# Patient Record
Sex: MALE | Race: WHITE | NOT HISPANIC OR LATINO | URBAN - METROPOLITAN AREA
[De-identification: names, ages, dates, MRNs, and addresses within clinical notes are randomized per-mention and may not be internally consistent; named-entity substitution may affect disease eponyms.]

---

## 2017-01-24 ENCOUNTER — FOLLOW UP ESTABLISHED (OUTPATIENT)
Dept: URBAN - METROPOLITAN AREA CLINIC 24 | Facility: CLINIC | Age: 77
End: 2017-01-24
Payer: MEDICARE

## 2017-01-24 DIAGNOSIS — H47.022 HEMORRHAGE IN OPTIC NERVE SHEATH, LEFT EYE: Primary | ICD-10-CM

## 2017-01-24 PROCEDURE — 99214 OFFICE O/P EST MOD 30 MIN: CPT | Performed by: OPHTHALMOLOGY

## 2017-01-24 PROCEDURE — 92250 FUNDUS PHOTOGRAPHY W/I&R: CPT | Performed by: OPHTHALMOLOGY

## 2017-01-24 PROCEDURE — 92083 EXTENDED VISUAL FIELD XM: CPT | Performed by: OPHTHALMOLOGY

## 2017-01-24 RX ORDER — PREDNISONE 20 MG/1
20 MG TABLET ORAL
Qty: 7 | Refills: 0 | Status: INACTIVE
Start: 2017-01-24 | End: 2017-01-30

## 2017-01-24 RX ORDER — DUREZOL 0.5 MG/ML
0.05 % EMULSION OPHTHALMIC
Qty: 1 | Refills: 0 | Status: INACTIVE
Start: 2017-01-24 | End: 2017-02-20

## 2017-01-24 RX ORDER — KETOROLAC TROMETHAMINE 5 MG/ML
0.5 % SOLUTION OPHTHALMIC
Qty: 1 | Refills: 0 | Status: INACTIVE
Start: 2017-01-24 | End: 2017-02-28

## 2017-01-24 RX ORDER — CHOLECALCIFEROL (VITAMIN D3) 10 MCG
CAPSULE ORAL
Qty: 0 | Refills: 0 | Status: INACTIVE
Start: 2017-01-24 | End: 2017-01-24

## 2017-01-24 RX ORDER — MOXIFLOXACIN HYDROCHLORIDE 5 MG/ML
0.5 % SOLUTION/ DROPS OPHTHALMIC
Qty: 1 | Refills: 1 | Status: INACTIVE
Start: 2017-01-24 | End: 2017-02-28

## 2017-01-24 ASSESSMENT — INTRAOCULAR PRESSURE
OS: 18
OD: 16

## 2017-02-08 ENCOUNTER — Encounter (OUTPATIENT)
Dept: URBAN - METROPOLITAN AREA CLINIC 10 | Facility: CLINIC | Age: 77
End: 2017-02-08
Payer: MEDICARE

## 2017-02-08 DIAGNOSIS — Z01.818 ENCOUNTER FOR OTHER PREPROCEDURAL EXAMINATION: Primary | ICD-10-CM

## 2017-02-08 PROCEDURE — 99213 OFFICE O/P EST LOW 20 MIN: CPT | Performed by: PHYSICIAN ASSISTANT

## 2017-02-08 RX ORDER — ATENOLOL 50 MG/1
50 MG TABLET ORAL
Qty: 0 | Refills: 0 | Status: ACTIVE
Start: 2017-02-08

## 2017-02-14 ENCOUNTER — SURGERY (OUTPATIENT)
Dept: URBAN - METROPOLITAN AREA SURGERY 12 | Facility: SURGERY | Age: 77
End: 2017-02-14
Payer: MEDICARE

## 2017-02-14 PROCEDURE — 66172 INCISION OF EYE: CPT | Performed by: OPHTHALMOLOGY

## 2017-02-15 ENCOUNTER — POST OP (OUTPATIENT)
Dept: URBAN - METROPOLITAN AREA CLINIC 24 | Facility: CLINIC | Age: 77
End: 2017-02-15

## 2017-02-15 PROCEDURE — 99024 POSTOP FOLLOW-UP VISIT: CPT | Performed by: OPHTHALMOLOGY

## 2017-02-15 RX ORDER — TIMOLOL MALEATE 5 MG/ML
0.5 % SOLUTION/ DROPS OPHTHALMIC
Qty: 3 | Refills: 3 | Status: INACTIVE
Start: 2017-02-15 | End: 2017-02-15

## 2017-02-15 RX ORDER — ATROPINE SULFATE 10 MG/ML
1 % SOLUTION/ DROPS OPHTHALMIC
Qty: 1 | Refills: 0 | Status: INACTIVE
Start: 2017-02-15 | End: 2017-04-21

## 2017-02-15 RX ORDER — TIMOLOL MALEATE 5 MG/ML
0.5 % SOLUTION/ DROPS OPHTHALMIC
Qty: 3 | Refills: 3 | Status: INACTIVE
Start: 2017-02-15 | End: 2017-04-21

## 2017-02-15 RX ORDER — LATANOPROST 50 UG/ML
0.005 % SOLUTION OPHTHALMIC
Qty: 3 | Refills: 3 | Status: INACTIVE
Start: 2017-02-15 | End: 2017-04-21

## 2017-02-15 ASSESSMENT — INTRAOCULAR PRESSURE: OD: 2

## 2017-02-20 ENCOUNTER — Encounter (OUTPATIENT)
Dept: URBAN - METROPOLITAN AREA CLINIC 10 | Facility: CLINIC | Age: 77
End: 2017-02-20
Payer: MEDICARE

## 2017-02-20 PROCEDURE — 65855 TRABECULOPLASTY LASER SURG: CPT | Performed by: OPHTHALMOLOGY

## 2017-02-20 PROCEDURE — 68200 TREAT EYELID BY INJECTION: CPT | Performed by: OPHTHALMOLOGY

## 2017-02-20 PROCEDURE — 99024 POSTOP FOLLOW-UP VISIT: CPT | Performed by: OPHTHALMOLOGY

## 2017-02-20 PROCEDURE — CRBAL CREDIT BALANCE: CUSTOM | Performed by: OPHTHALMOLOGY

## 2017-02-20 RX ORDER — DUREZOL 0.5 MG/ML
0.05 % EMULSION OPHTHALMIC
Qty: 1 | Refills: 0 | Status: INACTIVE
Start: 2017-02-20 | End: 2017-03-17

## 2017-02-20 ASSESSMENT — INTRAOCULAR PRESSURE
OD: 7
OS: 16
OS: 17
OD: 7
OS: 17
OS: 16

## 2017-02-28 ENCOUNTER — POST OP (OUTPATIENT)
Dept: URBAN - METROPOLITAN AREA CLINIC 24 | Facility: CLINIC | Age: 77
End: 2017-02-28

## 2017-02-28 PROCEDURE — 99024 POSTOP FOLLOW-UP VISIT: CPT | Performed by: OPHTHALMOLOGY

## 2017-02-28 RX ORDER — HYPROMELLOSE 3 MG/G
0.3 % GEL OPHTHALMIC
Qty: 0 | Refills: 0 | Status: INACTIVE
Start: 2017-02-20 | End: 2018-10-10

## 2017-02-28 RX ORDER — POLYETHYLENE GLYCOL AND PROPYLENE GLYCOL 4; 3 MG/ML; MG/ML
SOLUTION/ DROPS OPHTHALMIC
Qty: 0 | Refills: 0 | Status: ACTIVE
Start: 2017-02-20

## 2017-02-28 ASSESSMENT — INTRAOCULAR PRESSURE: OD: 5

## 2017-03-02 ENCOUNTER — POST OP (OUTPATIENT)
Dept: URBAN - METROPOLITAN AREA CLINIC 24 | Facility: CLINIC | Age: 77
End: 2017-03-02

## 2017-03-02 PROCEDURE — 99024 POSTOP FOLLOW-UP VISIT: CPT | Performed by: OPTOMETRIST

## 2017-03-02 ASSESSMENT — INTRAOCULAR PRESSURE
OD: 5
OS: 12

## 2017-03-14 ENCOUNTER — POST OP (OUTPATIENT)
Dept: URBAN - METROPOLITAN AREA CLINIC 24 | Facility: CLINIC | Age: 77
End: 2017-03-14

## 2017-03-14 PROCEDURE — 99024 POSTOP FOLLOW-UP VISIT: CPT | Performed by: OPHTHALMOLOGY

## 2017-03-14 ASSESSMENT — INTRAOCULAR PRESSURE: OD: 8

## 2017-03-21 ENCOUNTER — POST OP (OUTPATIENT)
Dept: URBAN - METROPOLITAN AREA CLINIC 24 | Facility: CLINIC | Age: 77
End: 2017-03-21

## 2017-03-21 PROCEDURE — 99024 POSTOP FOLLOW-UP VISIT: CPT | Performed by: OPTOMETRIST

## 2017-03-21 ASSESSMENT — INTRAOCULAR PRESSURE
OD: 12
OS: 14

## 2017-03-28 ENCOUNTER — POST OP (OUTPATIENT)
Dept: URBAN - METROPOLITAN AREA CLINIC 24 | Facility: CLINIC | Age: 77
End: 2017-03-28
Payer: MEDICARE

## 2017-03-28 PROCEDURE — 99024 POSTOP FOLLOW-UP VISIT: CPT | Performed by: OPHTHALMOLOGY

## 2017-03-28 ASSESSMENT — INTRAOCULAR PRESSURE
OD: 7
OS: 16

## 2017-04-07 ENCOUNTER — POST OP (OUTPATIENT)
Dept: URBAN - METROPOLITAN AREA CLINIC 24 | Facility: CLINIC | Age: 77
End: 2017-04-07

## 2017-04-07 PROCEDURE — 99024 POSTOP FOLLOW-UP VISIT: CPT | Performed by: OPTOMETRIST

## 2017-04-07 ASSESSMENT — INTRAOCULAR PRESSURE
OD: 8
OS: 16

## 2017-04-21 ENCOUNTER — POST OP (OUTPATIENT)
Dept: URBAN - METROPOLITAN AREA CLINIC 24 | Facility: CLINIC | Age: 77
End: 2017-04-21

## 2017-04-21 DIAGNOSIS — Z98.83 FILTERING (VITREOUS) BLEB AFTER GLAUCOMA SURGERY STATUS: Primary | ICD-10-CM

## 2017-04-21 PROCEDURE — 99024 POSTOP FOLLOW-UP VISIT: CPT | Performed by: OPHTHALMOLOGY

## 2017-04-21 RX ORDER — TIMOLOL MALEATE 5 MG/ML
0.5 % SOLUTION/ DROPS OPHTHALMIC
Qty: 3 | Refills: 3 | Status: INACTIVE
Start: 2017-04-21 | End: 2018-05-07

## 2017-04-21 RX ORDER — LATANOPROST 50 UG/ML
0.005 % SOLUTION OPHTHALMIC
Qty: 3 | Refills: 3 | Status: INACTIVE
Start: 2017-04-21 | End: 2018-05-07

## 2017-04-21 RX ORDER — DUREZOL 0.5 MG/ML
0.05 % EMULSION OPHTHALMIC
Qty: 1 | Refills: 2 | Status: INACTIVE
Start: 2017-04-21 | End: 2018-10-10

## 2017-04-21 ASSESSMENT — INTRAOCULAR PRESSURE
OS: 18
OD: 8

## 2017-05-01 ENCOUNTER — POST OP (OUTPATIENT)
Dept: URBAN - METROPOLITAN AREA CLINIC 24 | Facility: CLINIC | Age: 77
End: 2017-05-01

## 2017-05-01 PROCEDURE — 99024 POSTOP FOLLOW-UP VISIT: CPT | Performed by: OPTOMETRIST

## 2017-05-01 ASSESSMENT — INTRAOCULAR PRESSURE
OD: 10
OS: 18

## 2017-05-09 ENCOUNTER — FOLLOW UP ESTABLISHED (OUTPATIENT)
Dept: URBAN - METROPOLITAN AREA CLINIC 24 | Facility: CLINIC | Age: 77
End: 2017-05-09

## 2017-05-09 PROCEDURE — 99024 POSTOP FOLLOW-UP VISIT: CPT | Performed by: OPHTHALMOLOGY

## 2017-05-09 ASSESSMENT — INTRAOCULAR PRESSURE: OD: 9

## 2017-05-23 ENCOUNTER — POST OP (OUTPATIENT)
Dept: URBAN - METROPOLITAN AREA CLINIC 24 | Facility: CLINIC | Age: 77
End: 2017-05-23

## 2017-05-23 PROCEDURE — 99024 POSTOP FOLLOW-UP VISIT: CPT | Performed by: OPHTHALMOLOGY

## 2017-05-23 ASSESSMENT — INTRAOCULAR PRESSURE
OD: 7
OS: 16

## 2017-09-26 ENCOUNTER — FOLLOW UP ESTABLISHED (OUTPATIENT)
Dept: URBAN - METROPOLITAN AREA CLINIC 24 | Facility: CLINIC | Age: 77
End: 2017-09-26
Payer: MEDICARE

## 2017-09-26 DIAGNOSIS — H40.1133 PRIMARY OPEN-ANGLE GLAUCOMA, BILATERAL, SEVERE STAGE: Primary | ICD-10-CM

## 2017-09-26 PROCEDURE — 92014 COMPRE OPH EXAM EST PT 1/>: CPT | Performed by: OPHTHALMOLOGY

## 2017-09-26 PROCEDURE — 92083 EXTENDED VISUAL FIELD XM: CPT | Performed by: OPHTHALMOLOGY

## 2017-09-26 ASSESSMENT — INTRAOCULAR PRESSURE
OS: 16
OD: 9

## 2017-10-18 ENCOUNTER — FOLLOW UP ESTABLISHED (OUTPATIENT)
Dept: URBAN - METROPOLITAN AREA CLINIC 24 | Facility: CLINIC | Age: 77
End: 2017-10-18
Payer: MEDICARE

## 2017-10-18 PROCEDURE — 92012 INTRM OPH EXAM EST PATIENT: CPT | Performed by: OPHTHALMOLOGY

## 2017-10-18 ASSESSMENT — INTRAOCULAR PRESSURE
OS: 14
OD: 11

## 2018-04-11 ENCOUNTER — FOLLOW UP ESTABLISHED (OUTPATIENT)
Dept: URBAN - METROPOLITAN AREA CLINIC 24 | Facility: CLINIC | Age: 78
End: 2018-04-11
Payer: MEDICARE

## 2018-04-11 PROCEDURE — 92012 INTRM OPH EXAM EST PATIENT: CPT | Performed by: OPHTHALMOLOGY

## 2018-04-11 ASSESSMENT — INTRAOCULAR PRESSURE
OD: 10
OS: 15

## 2018-10-10 ENCOUNTER — FOLLOW UP ESTABLISHED (OUTPATIENT)
Dept: URBAN - METROPOLITAN AREA CLINIC 24 | Facility: CLINIC | Age: 78
End: 2018-10-10
Payer: MEDICARE

## 2018-10-10 PROCEDURE — 92012 INTRM OPH EXAM EST PATIENT: CPT | Performed by: OPHTHALMOLOGY

## 2018-10-10 PROCEDURE — 92083 EXTENDED VISUAL FIELD XM: CPT | Performed by: OPHTHALMOLOGY

## 2018-10-10 ASSESSMENT — INTRAOCULAR PRESSURE
OD: 10
OS: 13

## 2019-04-02 ENCOUNTER — FOLLOW UP ESTABLISHED (OUTPATIENT)
Dept: URBAN - METROPOLITAN AREA CLINIC 24 | Facility: CLINIC | Age: 79
End: 2019-04-02
Payer: MEDICARE

## 2019-04-02 PROCEDURE — 92012 INTRM OPH EXAM EST PATIENT: CPT | Performed by: OPHTHALMOLOGY

## 2019-04-02 ASSESSMENT — INTRAOCULAR PRESSURE
OD: 10
OS: 19

## 2019-10-01 ENCOUNTER — FOLLOW UP ESTABLISHED (OUTPATIENT)
Dept: URBAN - METROPOLITAN AREA CLINIC 24 | Facility: CLINIC | Age: 79
End: 2019-10-01
Payer: MEDICARE

## 2019-10-01 PROCEDURE — 92012 INTRM OPH EXAM EST PATIENT: CPT | Performed by: OPHTHALMOLOGY

## 2019-10-01 RX ORDER — LATANOPROST 50 UG/ML
0.005 % SOLUTION OPHTHALMIC
Qty: 3 | Refills: 3 | Status: INACTIVE
Start: 2019-10-01 | End: 2020-12-08

## 2019-10-01 RX ORDER — TIMOLOL MALEATE 5 MG/ML
0.5 % SOLUTION/ DROPS OPHTHALMIC
Qty: 3 | Refills: 3 | Status: INACTIVE
Start: 2019-10-01 | End: 2021-02-11

## 2019-10-01 ASSESSMENT — INTRAOCULAR PRESSURE
OD: 12
OS: 20

## 2020-05-19 ENCOUNTER — FOLLOW UP ESTABLISHED (OUTPATIENT)
Dept: URBAN - METROPOLITAN AREA CLINIC 24 | Facility: CLINIC | Age: 80
End: 2020-05-19
Payer: MEDICARE

## 2020-05-19 PROCEDURE — 92012 INTRM OPH EXAM EST PATIENT: CPT | Performed by: OPHTHALMOLOGY

## 2020-05-19 ASSESSMENT — INTRAOCULAR PRESSURE
OD: 8
OS: 17

## 2020-11-17 ENCOUNTER — FOLLOW UP ESTABLISHED (OUTPATIENT)
Dept: URBAN - METROPOLITAN AREA CLINIC 24 | Facility: CLINIC | Age: 80
End: 2020-11-17
Payer: MEDICARE

## 2020-11-17 PROCEDURE — 92012 INTRM OPH EXAM EST PATIENT: CPT | Performed by: OPHTHALMOLOGY

## 2020-11-17 PROCEDURE — 92250 FUNDUS PHOTOGRAPHY W/I&R: CPT | Performed by: OPHTHALMOLOGY

## 2020-11-17 ASSESSMENT — INTRAOCULAR PRESSURE
OS: 22
OD: 22

## 2021-07-20 ENCOUNTER — OFFICE VISIT (OUTPATIENT)
Dept: URBAN - METROPOLITAN AREA CLINIC 24 | Facility: CLINIC | Age: 81
End: 2021-07-20
Payer: OTHER MISCELLANEOUS

## 2021-07-20 PROCEDURE — 99213 OFFICE O/P EST LOW 20 MIN: CPT | Performed by: OPHTHALMOLOGY

## 2021-07-20 ASSESSMENT — INTRAOCULAR PRESSURE
OD: 8
OS: 18

## 2021-07-20 NOTE — IMPRESSION/PLAN
Impression: Anatomical narrow angle, left eye Plan: THE HAS POAG & AN OCCLUDABLE ANGLE OS AS OF 4/20/16  GONIO OD=SS // OS=ANT TM (4/11/18)  PACHS 826/593 S/P TRABECULECTOMY WITH IRIDECTOMY OD 12/2010 BY DR. Nazia Cuadra;  S/P REPEAT TRABECULECTOMY OD 2/14/17 BY DR CHAMBERS
S/P SLT OS 02/20/17 DR CHAMBERS
PT DENIES SULFA ALLERGY
PT DENIES LUNG DZ, BUT HAS PACEMAKER FOR BRADYCARDIA- WISHES TO CONTINUE TIMOLOL TARGET IOP LOW TEENS OR LESS OU
RECOMMEND 1. CONTINUE TIMOLOL 1/2%  OS QAM  
2. CONTINUE LATANOPROST OS QPM  
3. INTOLERANT TO BRIMONIDINE (STOPPED 4/7/14) AND LIMITED RESPONSE TO MIKA'S (DEFERS ADDING  5/21/14-4/2/19) 4. RECOMMEND LPI OS, PT DEFERS 4/20/16- 07/20/2021  (PT IS S/P SURGICAL IRIDECTOMY OD) 5.  F/U 6 MONTHS OR SOONER IF PT WISHES TO UNDERGO  LPI OS

## 2022-02-01 ENCOUNTER — OFFICE VISIT (OUTPATIENT)
Dept: URBAN - METROPOLITAN AREA CLINIC 24 | Facility: CLINIC | Age: 82
End: 2022-02-01
Payer: OTHER MISCELLANEOUS

## 2022-02-01 DIAGNOSIS — H40.032 ANATOMICAL NARROW ANGLE, LEFT EYE: Primary | ICD-10-CM

## 2022-02-01 PROCEDURE — 99214 OFFICE O/P EST MOD 30 MIN: CPT | Performed by: OPHTHALMOLOGY

## 2022-02-01 PROCEDURE — 92133 CPTRZD OPH DX IMG PST SGM ON: CPT | Performed by: OPHTHALMOLOGY

## 2022-02-01 PROCEDURE — 92083 EXTENDED VISUAL FIELD XM: CPT | Performed by: OPHTHALMOLOGY

## 2022-02-01 ASSESSMENT — INTRAOCULAR PRESSURE
OD: 9
OS: 19

## 2022-02-01 NOTE — IMPRESSION/PLAN
Impression: Anatomical narrow angle, left eye Plan: THE HAS POAG & AN OCCLUDABLE ANGLE OS AS OF 4/20/16  GONIO OD=SS // OS=ANT TM (4/11/18)  PACHS 784/358 S/P TRABECULECTOMY  OD 12/2010 BY DR. Rosie Henry;  S/P REPEAT TRABECULECTOMY OD 2/14/17 BY DR CHAMBERS
S/P SLT OS 02/20/17 DR CHAMBERS
PT DENIES SULFA ALLERGY
PT DENIES LUNG DZ, BUT HAS PACEMAKER FOR BRADYCARDIA- WISHES TO CONTINUE TIMOLOL TARGET IOP LOW TEENS OR LESS OU
RECOMMEND 1. CONTINUE TIMOLOL 1/2%  OS QAM  
2. CONTINUE LATANOPROST OS QPM  
3. INTOLERANT TO BRIMONIDINE (STOPPED 4/7/14) AND LIMITED RESPONSE TO MIKA'S (DEFERS ADDING  5/21/14-2/1/22) 4. RECOMMEND LPI OS, PT DEFERS 4/20/16- 02/01/22  
5.  F/U 6-12 MONTHS OR SOONER IF PT WISHES TO UNDERGO  LPI OS

## 2022-11-08 ENCOUNTER — OFFICE VISIT (OUTPATIENT)
Dept: URBAN - METROPOLITAN AREA CLINIC 24 | Facility: CLINIC | Age: 82
End: 2022-11-08
Payer: OTHER MISCELLANEOUS

## 2022-11-08 DIAGNOSIS — H40.032 ANATOMICAL NARROW ANGLE, LEFT EYE: Primary | ICD-10-CM

## 2022-11-08 PROCEDURE — 92020 GONIOSCOPY: CPT | Performed by: OPHTHALMOLOGY

## 2022-11-08 PROCEDURE — 99213 OFFICE O/P EST LOW 20 MIN: CPT | Performed by: OPHTHALMOLOGY

## 2022-11-08 RX ORDER — PREDNISOLONE ACETATE 10 MG/ML
1 % SUSPENSION/ DROPS OPHTHALMIC
Qty: 10 | Refills: 0 | Status: ACTIVE
Start: 2022-11-08

## 2022-11-08 ASSESSMENT — INTRAOCULAR PRESSURE
OD: 6
OS: 19

## 2022-11-08 NOTE — IMPRESSION/PLAN
Impression: Anatomical narrow angle, left eye Plan: THE HAS POAG & AN OCCLUDABLE ANGLE OS AS OF 4/20/16  GONIO OD=SS // OS=ANT TM (4/11/18)  PACHS 743/958 S/P TRABECULECTOMY  OD 12/2010 BY DR. Camilla Pruitt;  S/P REPEAT TRABECULECTOMY OD 2/14/17 BY DR CHAMBERS
S/P SLT OS 02/20/17 DR CHAMBERS
PT DENIES SULFA ALLERGY
PT DENIES LUNG DZ, BUT HAS PACEMAKER FOR BRADYCARDIA- WISHES TO CONTINUE TIMOLOL TARGET IOP LOW TEENS OR LESS OU
RECOMMEND 1. CONTINUE TIMOLOL 1/2%  OS QAM  
2. CONTINUE LATANOPROST OS QPM  
3. INTOLERANT TO BRIMONIDINE (STOPPED 4/7/14) AND LIMITED RESPONSE TO MIKA'S (DEFERS ADDING  5/21/14-2/1/22) 4. RECOMMEND LPI OS, PT WISHES TO PROCEED WITH LPI OS - 11/08/2022 5.  SCHEDULE LPI OS

## 2022-12-06 ENCOUNTER — SURGERY (OUTPATIENT)
Dept: URBAN - METROPOLITAN AREA SURGERY 12 | Facility: SURGERY | Age: 82
End: 2022-12-06
Payer: OTHER MISCELLANEOUS

## 2022-12-06 PROCEDURE — 66761 REVISION OF IRIS: CPT | Performed by: OPHTHALMOLOGY

## 2023-01-03 ENCOUNTER — OFFICE VISIT (OUTPATIENT)
Dept: URBAN - METROPOLITAN AREA CLINIC 24 | Facility: CLINIC | Age: 83
End: 2023-01-03
Payer: MEDICARE

## 2023-01-03 DIAGNOSIS — H40.032 ANATOMICAL NARROW ANGLE, LEFT EYE: Primary | ICD-10-CM

## 2023-01-03 PROCEDURE — 92020 GONIOSCOPY: CPT | Performed by: OPHTHALMOLOGY

## 2023-01-03 PROCEDURE — 99213 OFFICE O/P EST LOW 20 MIN: CPT | Performed by: OPHTHALMOLOGY

## 2023-01-03 ASSESSMENT — INTRAOCULAR PRESSURE
OS: 11
OD: 6

## 2023-01-03 NOTE — IMPRESSION/PLAN
Impression: Anatomical narrow angle, left eye Plan: THE HAS POAG & AN OCCLUDABLE ANGLE OS AS OF 4/20/16  GONIO OD=SS // OS=ANT TM (4/11/18)  PACHS 186/001 S/P TRABECULECTOMY  OD 12/2010 BY DR. Anton Smith;  S/P REPEAT TRABECULECTOMY OD 2/14/17 BY DR CHAMBERS
S/P SLT OS 02/20/17 DR CHAMBERS
PT DENIES SULFA ALLERGY
PT DENIES LUNG DZ, BUT HAS PACEMAKER FOR BRADYCARDIA- WISHES TO CONTINUE TIMOLOL TARGET IOP LOW TEENS OR LESS OU
RECOMMEND 1. CONTINUE TIMOLOL 1/2%  OS QAM  
2. CONTINUE LATANOPROST OS QPM  
3. INTOLERANT TO BRIMONIDINE (STOPPED 4/7/14) AND LIMITED RESPONSE TO MIKA'S (DEFERS ADDING  5/21/14-2/1/22) 4. PT IS DOING WELL S/P LPI OU ; LPI PATENT OU 5. PT IS SAFE FOR DILATION AS OF TODAY ; SCHEDULE COMPLETE EXAM W OPTOMETRY FOR ASSUMPTION OF CARE 6. RETURN IN 4-6 MONTHS FOR IOP CHECK VF AND OPTOS

## 2023-07-18 ENCOUNTER — OFFICE VISIT (OUTPATIENT)
Dept: URBAN - METROPOLITAN AREA CLINIC 24 | Facility: CLINIC | Age: 83
End: 2023-07-18
Payer: MEDICARE

## 2023-07-18 DIAGNOSIS — H40.032 ANATOMICAL NARROW ANGLE, LEFT EYE: Primary | ICD-10-CM

## 2023-07-18 PROCEDURE — 92083 EXTENDED VISUAL FIELD XM: CPT | Performed by: OPHTHALMOLOGY

## 2023-07-18 PROCEDURE — 99214 OFFICE O/P EST MOD 30 MIN: CPT | Performed by: OPHTHALMOLOGY

## 2023-07-18 RX ORDER — TIMOLOL MALEATE 5 MG/ML
0.5 % SOLUTION/ DROPS OPHTHALMIC
Qty: 15 | Refills: 3 | Status: ACTIVE
Start: 2023-07-18

## 2023-07-18 RX ORDER — LATANOPROST 50 UG/ML
0.005 % SOLUTION OPHTHALMIC
Qty: 7.5 | Refills: 3 | Status: ACTIVE
Start: 2023-07-18

## 2023-07-18 ASSESSMENT — INTRAOCULAR PRESSURE
OD: 8
OS: 15

## 2023-07-18 NOTE — IMPRESSION/PLAN
Impression: Anatomical narrow angle, left eye Plan: THE HAS POAG & AN OCCLUDABLE ANGLE OS AS OF 4/20/16  GONIO OD=SS // OS=SS  7/28/23))  PACHS 545/538 S/P TRABECULECTOMY  OD 12/2010 BY DR. Betty Murray;  S/P REPEAT TRABECULECTOMY OD 2/14/17 BY DR CHAMBERS
S/P SLT OS 02/20/17 DR CHAMBERS
S/P LPI OS 12/06/2022 PT DENIES SULFA ALLERGY
PT DENIES LUNG DZ, BUT HAS PACEMAKER FOR BRADYCARDIA- WISHES TO CONTINUE TIMOLOL TARGET IOP LOW TEENS OR LESS OU
RECOMMEND 1. CONTINUE TIMOLOL 1/2%  OS QAM  
2. CONTINUE LATANOPROST OS QPM  
3. INTOLERANT TO BRIMONIDINE (STOPPED 4/7/14) AND LIMITED RESPONSE TO MIKA'S (DEFERS ADDING  5/21/14-2/1/22) 4. RECOMMEND CONSULT DR Juliano Kwok FOR SURGICAL TREATMENT OS
5.  THE PT WOULD LIKE TO REPEAT VF ESPECIALLY OS AS HE FEELS THE RESULT MAY HAVE BEEN IMPACTED BY OCCLUDER